# Patient Record
Sex: MALE | Race: WHITE | URBAN - METROPOLITAN AREA
[De-identification: names, ages, dates, MRNs, and addresses within clinical notes are randomized per-mention and may not be internally consistent; named-entity substitution may affect disease eponyms.]

---

## 2022-03-19 PROBLEM — I10 ESSENTIAL HYPERTENSION: Status: ACTIVE | Noted: 2017-02-10

## 2022-08-15 RX ORDER — LOSARTAN POTASSIUM 50 MG/1
TABLET ORAL
Qty: 90 TABLET | Refills: 7 | Status: SHIPPED | OUTPATIENT
Start: 2022-08-15

## 2022-09-20 ENCOUNTER — OFFICE VISIT (OUTPATIENT)
Dept: CARDIOLOGY CLINIC | Age: 67
End: 2022-09-20
Payer: MEDICARE

## 2022-09-20 VITALS
SYSTOLIC BLOOD PRESSURE: 136 MMHG | BODY MASS INDEX: 30.21 KG/M2 | HEART RATE: 96 BPM | WEIGHT: 211 LBS | DIASTOLIC BLOOD PRESSURE: 84 MMHG | HEIGHT: 70 IN

## 2022-09-20 DIAGNOSIS — I10 ESSENTIAL HYPERTENSION: ICD-10-CM

## 2022-09-20 DIAGNOSIS — I25.10 CORONARY ARTERY DISEASE INVOLVING NATIVE CORONARY ARTERY OF NATIVE HEART WITHOUT ANGINA PECTORIS: Primary | ICD-10-CM

## 2022-09-20 DIAGNOSIS — E78.5 DYSLIPIDEMIA: ICD-10-CM

## 2022-09-20 PROCEDURE — G8417 CALC BMI ABV UP PARAM F/U: HCPCS | Performed by: INTERNAL MEDICINE

## 2022-09-20 PROCEDURE — 3017F COLORECTAL CA SCREEN DOC REV: CPT | Performed by: INTERNAL MEDICINE

## 2022-09-20 PROCEDURE — 1123F ACP DISCUSS/DSCN MKR DOCD: CPT | Performed by: INTERNAL MEDICINE

## 2022-09-20 PROCEDURE — 1036F TOBACCO NON-USER: CPT | Performed by: INTERNAL MEDICINE

## 2022-09-20 PROCEDURE — 99214 OFFICE O/P EST MOD 30 MIN: CPT | Performed by: INTERNAL MEDICINE

## 2022-09-20 PROCEDURE — G8427 DOCREV CUR MEDS BY ELIG CLIN: HCPCS | Performed by: INTERNAL MEDICINE

## 2022-09-20 RX ORDER — NITROGLYCERIN 400 UG/1
1 SPRAY ORAL EVERY 5 MIN PRN
Qty: 1 EACH | Refills: 5 | Status: SHIPPED | OUTPATIENT
Start: 2022-09-20

## 2022-09-20 ASSESSMENT — ENCOUNTER SYMPTOMS
BOWEL INCONTINENCE: 0
HEMATOCHEZIA: 0
DIARRHEA: 0
WHEEZING: 0
BLURRED VISION: 0
HOARSE VOICE: 0
SPUTUM PRODUCTION: 0
HEMATEMESIS: 0
SHORTNESS OF BREATH: 0
COLOR CHANGE: 0
CHEST TIGHTNESS: 0
ORTHOPNEA: 0
ABDOMINAL PAIN: 0

## 2022-09-20 NOTE — PROGRESS NOTES
[facility] CARDIOLOGY  [address], [address]  PHONE: [phone]        22        NAME:  Tika Reyez  : 1955  MRN: [id number]       SUBJECTIVE:   Tika Reyez is a 69 y.o. male seen for a follow up visit regarding the following: The patient has CAD with CABG,primary hypertension,and symptomatic PVC's. Historically,beta blocker use has been limited due to bradycardia. He returns for scheduled follow up. He reports doing well. Chief Complaint   Patient presents with    Coronary Artery Disease     6 month       HPI:    Coronary Artery Disease  Presents for follow-up visit. Pertinent negatives include no chest pain, chest pressure, chest tightness, dizziness, leg swelling, muscle weakness, palpitations, shortness of breath or weight gain. The symptoms have been stable. Compliance with diet is good. Compliance with exercise is variable. Compliance with medications is good. Past Medical History, Past Surgical History, Family history, Social History, and Medications were all reviewed with the patient today and updated as necessary.          Current Outpatient Medications:     nitroGLYCERIN (NITROLINGUAL) 0.4 MG/SPRAY 0.4 mg spray, Place 1 spray under the tongue every 5 minutes as needed for Chest pain, Disp: 1 each, Rfl: 5    losartan (COZAAR) 50 MG tablet, TAKE 2 TABLETS DAILY, Disp: 90 tablet, Rfl: 7    aspirin 81 MG EC tablet, Take 81 mg by mouth daily, Disp: , Rfl:     pravastatin (PRAVACHOL) 40 MG tablet, Take 40 mg by mouth, Disp: , Rfl:     tadalafil (CIALIS) 5 MG tablet, Take 5 mg by mouth, Disp: , Rfl:     amLODIPine (NORVASC) 10 MG tablet, Take 10 mg by mouth daily (Patient not taking: Reported on 2022), Disp: , Rfl:   Allergies   Allergen Reactions    Finasteride Other (See Comments)    Silodosin Palpitations    Tamsulosin Palpitations     Past Medical History:   Diagnosis Date    Aortic valve insufficiency 3/2/2016    Arrhythmia     CAD (coronary artery disease)     Coronary atherosclerosis of native coronary vessel 04/2013    Essential hypertension 2/10/2017    GERD (gastroesophageal reflux disease)     Hypercholesterolemia     Hypertension     Palpitations 06/2013    Preop cardiovascular exam 2/10/2017    PVC's (premature ventricular contractions) 3/2/2016     Past Surgical History:   Procedure Laterality Date    CABG, ARTERY-VEIN, FOUR  5/27/11    CORONARY ARTERY BYPASS GRAFT  2011    LEFT HEART CATH,PERCUTANEOUS  05/22/2013    Patent LIMA-LAD,Patent DAKOTA-Ramus,Patent SVG-PDA,and LV EF=55-60%. No intervention performed. LEFT HEART CATH,PERCUTANEOUS  05/26/52011    multivessel disease    ORTHOPEDIC SURGERY  1994    back surgery, \"disc trimming\"     Family History   Problem Relation Age of Onset    Heart Disease Mother     Heart Disease Father       Social History     Tobacco Use    Smoking status: Never    Smokeless tobacco: Never    Tobacco comments:     Quit smoking: since 1979   Substance Use Topics    Alcohol use: Yes       ROS:    Review of Systems   Constitutional: Negative for chills, decreased appetite, diaphoresis, fever, malaise/fatigue and weight gain. HENT:  Negative for congestion, hearing loss, hoarse voice and nosebleeds. Eyes:  Negative for blurred vision. Cardiovascular:  Negative for chest pain, claudication, cyanosis, dyspnea on exertion, irregular heartbeat, leg swelling, near-syncope, orthopnea, palpitations, paroxysmal nocturnal dyspnea and syncope. Respiratory:  Negative for chest tightness, shortness of breath, sputum production and wheezing. Endocrine: Negative for polydipsia, polyphagia and polyuria. Skin:  Negative for color change. Musculoskeletal:  Negative for muscle weakness. Gastrointestinal:  Negative for abdominal pain, bowel incontinence, diarrhea, hematemesis and hematochezia. Genitourinary:  Negative for dysuria, frequency and hematuria.    Neurological:  Negative for dizziness, focal weakness, light-headedness, loss of balance, numbness, sensory change and weakness. Psychiatric/Behavioral:  Negative for altered mental status and memory loss. PHYSICAL EXAM:   /84   Pulse 96   Ht 5' 10\" (1.778 m)   Wt 211 lb (95.7 kg)   BMI 30.28 kg/m²      Physical Exam  Constitutional:       Appearance: Normal appearance. HENT:      Head: Normocephalic and atraumatic. Nose: Nose normal.   Eyes:      Extraocular Movements: Extraocular movements intact. Pupils: Pupils are equal, round, and reactive to light. Neck:      Vascular: No carotid bruit. Cardiovascular:      Rate and Rhythm: Regular rhythm. Pulses: Normal pulses. Heart sounds: No murmur heard. Pulmonary:      Effort: Pulmonary effort is normal.      Breath sounds: Normal breath sounds. Abdominal:      General: Abdomen is flat. Bowel sounds are normal.      Palpations: Abdomen is soft. Musculoskeletal:         General: Normal range of motion. Cervical back: Normal range of motion and neck supple. Skin:     General: Skin is warm and dry. Neurological:      General: No focal deficit present. Mental Status: He is alert and oriented to person, place, and time. Psychiatric:         Mood and Affect: Mood normal.       Medical problems and test results were reviewed with the patient today. ASSESSMENT and PLAN    Portillo Fields was seen today for coronary artery disease. Diagnoses and all orders for this visit:    Coronary artery disease involving native coronary artery of native heart without angina pectoris:Stable. Continue ASA,Pravastatin,and Losartan. -     nitroGLYCERIN (NITROLINGUAL) 0.4 MG/SPRAY 0.4 mg spray; Place 1 spray under the tongue every 5 minutes as needed for Chest pain    Essential hypertension:Stable. Continue Norvasc and Cozaar.     Dyslipidemia:Lipids are at goal.Lipid panel on 9/8/22 :UN=997,TG=46,HDL=44,and LDL=64 were at goal.Continue Pravastatin 40 mg daily.        Disposition:    Return in about 1 year (around 9/20/2023).                 Sammy Bailon MD  9/20/2022  1:10 PM

## 2023-03-06 LAB — PROSTATE SPECIFIC ANTIGEN: 1.4 NG/ML

## 2023-08-11 ENCOUNTER — PATIENT MESSAGE (OUTPATIENT)
Age: 68
End: 2023-08-11

## 2023-08-11 RX ORDER — LOSARTAN POTASSIUM 100 MG/1
100 TABLET ORAL DAILY
Qty: 90 TABLET | Refills: 3 | Status: SHIPPED | OUTPATIENT
Start: 2023-08-11

## 2023-08-11 NOTE — TELEPHONE ENCOUNTER
From: Thurnell Kehr  To: Dr. Davis Macedo: 8/11/2023 7:56 AM EDT  Subject: Losartan 100 mg daily    I run out of BP medication on 8/24 and my appointment is not until 8/29.    Could you please call in a refill to my local pharmacy ( we are no longer using [facility] mail order) I would prefer to take 1, 100 mg tablet a day ( recent RX was 2, 50 mg per day)  [address]   [address] [place] [address] [place]  Due to a family medical issue we are going to be out of town and was hoping to get this before Sunday)  Thanks  Flaco Thomas

## 2023-08-29 ENCOUNTER — OFFICE VISIT (OUTPATIENT)
Age: 68
End: 2023-08-29
Payer: MEDICARE

## 2023-08-29 VITALS
DIASTOLIC BLOOD PRESSURE: 70 MMHG | BODY MASS INDEX: 29.92 KG/M2 | WEIGHT: 209 LBS | SYSTOLIC BLOOD PRESSURE: 136 MMHG | HEART RATE: 47 BPM | HEIGHT: 70 IN

## 2023-08-29 DIAGNOSIS — I49.3 VENTRICULAR PREMATURE DEPOLARIZATION: Primary | ICD-10-CM

## 2023-08-29 DIAGNOSIS — E78.5 DYSLIPIDEMIA: ICD-10-CM

## 2023-08-29 DIAGNOSIS — I35.1 NONRHEUMATIC AORTIC VALVE INSUFFICIENCY: ICD-10-CM

## 2023-08-29 DIAGNOSIS — I25.10 CORONARY ARTERY DISEASE INVOLVING NATIVE CORONARY ARTERY OF NATIVE HEART WITHOUT ANGINA PECTORIS: ICD-10-CM

## 2023-08-29 DIAGNOSIS — I10 ESSENTIAL HYPERTENSION: ICD-10-CM

## 2023-08-29 PROCEDURE — G8417 CALC BMI ABV UP PARAM F/U: HCPCS | Performed by: INTERNAL MEDICINE

## 2023-08-29 PROCEDURE — 1036F TOBACCO NON-USER: CPT | Performed by: INTERNAL MEDICINE

## 2023-08-29 PROCEDURE — 3078F DIAST BP <80 MM HG: CPT | Performed by: INTERNAL MEDICINE

## 2023-08-29 PROCEDURE — 93000 ELECTROCARDIOGRAM COMPLETE: CPT | Performed by: INTERNAL MEDICINE

## 2023-08-29 PROCEDURE — 3017F COLORECTAL CA SCREEN DOC REV: CPT | Performed by: INTERNAL MEDICINE

## 2023-08-29 PROCEDURE — 3075F SYST BP GE 130 - 139MM HG: CPT | Performed by: INTERNAL MEDICINE

## 2023-08-29 PROCEDURE — 1123F ACP DISCUSS/DSCN MKR DOCD: CPT | Performed by: INTERNAL MEDICINE

## 2023-08-29 PROCEDURE — 99214 OFFICE O/P EST MOD 30 MIN: CPT | Performed by: INTERNAL MEDICINE

## 2023-08-29 PROCEDURE — G8427 DOCREV CUR MEDS BY ELIG CLIN: HCPCS | Performed by: INTERNAL MEDICINE

## 2023-08-29 RX ORDER — LOSARTAN POTASSIUM 100 MG/1
100 TABLET ORAL DAILY
Qty: 90 TABLET | Refills: 3 | Status: SHIPPED | OUTPATIENT
Start: 2023-08-29

## 2023-08-29 RX ORDER — NITROGLYCERIN 400 UG/1
1 SPRAY ORAL EVERY 5 MIN PRN
Qty: 1 EACH | Refills: 5 | Status: SHIPPED | OUTPATIENT
Start: 2023-08-29

## 2023-08-29 ASSESSMENT — ENCOUNTER SYMPTOMS
ABDOMINAL PAIN: 0
WHEEZING: 0
ORTHOPNEA: 0
HOARSE VOICE: 0
CHEST TIGHTNESS: 0
COLOR CHANGE: 0
HEMATOCHEZIA: 0
HEMATEMESIS: 0
DIARRHEA: 0
SPUTUM PRODUCTION: 0
BLURRED VISION: 0
SHORTNESS OF BREATH: 0
BOWEL INCONTINENCE: 0

## 2023-08-29 NOTE — PROGRESS NOTES
[facility] CARDIOLOGY  [address], [address]  PHONE: [phone]        23        NAME:  Patricia Siddiqi  : 1955  MRN: [id number]       SUBJECTIVE:   Patricia Siddiqi is a 77 y.o. male seen for a follow up visit regarding the following: CAD,primary hypertension,and PVC's. He returns for annual follow up. June Ventura reports doing well. Chief Complaint   Patient presents with    Irregular Heart Beat    Coronary Artery Disease       HPI:    Coronary Artery Disease  Presents for follow-up visit. Pertinent negatives include no chest pain, chest pressure, chest tightness, dizziness, leg swelling, muscle weakness, palpitations, shortness of breath or weight gain. The symptoms have been stable. Past Medical History, Past Surgical History, Family history, Social History, and Medications were all reviewed with the patient today and updated as necessary.          Current Outpatient Medications:     losartan (COZAAR) 100 MG tablet, Take 1 tablet by mouth daily, Disp: 90 tablet, Rfl: 3    nitroGLYCERIN (NITROLINGUAL) 0.4 MG/SPRAY 0.4 mg spray, Place 1 spray under the tongue every 5 minutes as needed for Chest pain, Disp: 1 each, Rfl: 5    amLODIPine (NORVASC) 10 MG tablet, Take 1 tablet by mouth as needed prn, Disp: , Rfl:     pravastatin (PRAVACHOL) 40 MG tablet, Take 1 tablet by mouth, Disp: , Rfl:     tadalafil (CIALIS) 5 MG tablet, Take 1 tablet by mouth, Disp: , Rfl:     aspirin 81 MG EC tablet, Take 81 mg by mouth daily (Patient not taking: Reported on 2023), Disp: , Rfl:   Allergies   Allergen Reactions    Finasteride Other (See Comments)    Silodosin Palpitations    Tamsulosin Palpitations     Past Medical History:   Diagnosis Date    Aortic valve insufficiency 3/2/2016    Arrhythmia     CAD (coronary artery disease)     Coronary atherosclerosis of native coronary vessel 2013    Essential hypertension 2/10/2017    GERD (gastroesophageal reflux disease) 16

## 2024-02-27 ENCOUNTER — OFFICE VISIT (OUTPATIENT)
Age: 69
End: 2024-02-27
Payer: MEDICARE

## 2024-02-27 VITALS
HEIGHT: 70 IN | WEIGHT: 214 LBS | HEART RATE: 64 BPM | DIASTOLIC BLOOD PRESSURE: 80 MMHG | BODY MASS INDEX: 30.64 KG/M2 | SYSTOLIC BLOOD PRESSURE: 138 MMHG

## 2024-02-27 DIAGNOSIS — I25.10 CORONARY ARTERY DISEASE INVOLVING NATIVE CORONARY ARTERY OF NATIVE HEART WITHOUT ANGINA PECTORIS: Primary | ICD-10-CM

## 2024-02-27 DIAGNOSIS — I35.1 NONRHEUMATIC AORTIC VALVE INSUFFICIENCY: ICD-10-CM

## 2024-02-27 DIAGNOSIS — E78.5 DYSLIPIDEMIA: ICD-10-CM

## 2024-02-27 DIAGNOSIS — I10 ESSENTIAL HYPERTENSION: ICD-10-CM

## 2024-02-27 PROCEDURE — 99214 OFFICE O/P EST MOD 30 MIN: CPT | Performed by: INTERNAL MEDICINE

## 2024-02-27 PROCEDURE — 1036F TOBACCO NON-USER: CPT | Performed by: INTERNAL MEDICINE

## 2024-02-27 PROCEDURE — G8427 DOCREV CUR MEDS BY ELIG CLIN: HCPCS | Performed by: INTERNAL MEDICINE

## 2024-02-27 PROCEDURE — 3017F COLORECTAL CA SCREEN DOC REV: CPT | Performed by: INTERNAL MEDICINE

## 2024-02-27 PROCEDURE — 1123F ACP DISCUSS/DSCN MKR DOCD: CPT | Performed by: INTERNAL MEDICINE

## 2024-02-27 PROCEDURE — G8484 FLU IMMUNIZE NO ADMIN: HCPCS | Performed by: INTERNAL MEDICINE

## 2024-02-27 PROCEDURE — G8417 CALC BMI ABV UP PARAM F/U: HCPCS | Performed by: INTERNAL MEDICINE

## 2024-02-27 PROCEDURE — 3075F SYST BP GE 130 - 139MM HG: CPT | Performed by: INTERNAL MEDICINE

## 2024-02-27 PROCEDURE — 3078F DIAST BP <80 MM HG: CPT | Performed by: INTERNAL MEDICINE

## 2024-02-27 RX ORDER — LOSARTAN POTASSIUM 100 MG/1
100 TABLET ORAL DAILY
Qty: 90 TABLET | Refills: 3 | Status: SHIPPED | OUTPATIENT
Start: 2024-02-27

## 2024-02-27 NOTE — PROGRESS NOTES
Basal Dimension 3.9 cm    RV Mid Dimension 2.6 cm    TAPSE 1.8 1.7 cm    Est. RA Pressure 3 mmHg    AR .0 cm    AV Vena Contracta 0.4 cm    AV Vena Contracta Area 0.4 cm2    AV VC/LVOT Diameter 0.20     AV VC/LVOT area 0.04     RVSP 21 mmHg     No results found for: \"CHOL\", \"CHOLPOCT\", \"CHOLX\", \"CHLST\", \"CHOLV\", \"HDL\", \"HDLPOC\", \"HDLC\", \"LDL\", \"LDLC\", \"VLDLC\", \"VLDL\", \"TGLX\", \"TRIGL\"  No results found for any visits on 02/27/24.    ASSESSMENT and PLAN    Rubén was seen today for coronary artery disease and irregular heart beat.    Diagnoses and all orders for this visit:    Coronary artery disease involving native coronary artery of native heart without angina pectoris:Stable with no chest pain reported.Continue prn NTG,ASA,and Pravastatin.    Essential hypertension:Borderline.Continue home BP monitoring.Call if BP remains >140/90.Continue Losartan and Norvasc.  -     losartan (COZAAR) 100 MG tablet; Take 1 tablet by mouth daily    Nonrheumatic aortic valve insufficiency:/AS/MR/TR:Mild valvular heart disease.Follow up echo in 1-2 years.    Dyslipidemia:Followed by PCP.Continue Pravastatin.Target LDL<70.          Disposition:    Return in about 1 year (around 2/27/2025).                SAIRA SIDHU MD  2/27/2024  10:20 AM

## 2024-09-04 ENCOUNTER — PATIENT MESSAGE (OUTPATIENT)
Age: 69
End: 2024-09-04

## 2025-03-11 ENCOUNTER — OFFICE VISIT (OUTPATIENT)
Age: 70
End: 2025-03-11
Payer: MEDICARE

## 2025-03-11 VITALS
WEIGHT: 217.6 LBS | HEART RATE: 55 BPM | DIASTOLIC BLOOD PRESSURE: 70 MMHG | SYSTOLIC BLOOD PRESSURE: 138 MMHG | BODY MASS INDEX: 31.22 KG/M2

## 2025-03-11 DIAGNOSIS — E78.5 DYSLIPIDEMIA: ICD-10-CM

## 2025-03-11 DIAGNOSIS — I35.0 NONRHEUMATIC AORTIC VALVE STENOSIS: ICD-10-CM

## 2025-03-11 DIAGNOSIS — I10 ESSENTIAL HYPERTENSION: ICD-10-CM

## 2025-03-11 DIAGNOSIS — I25.10 CORONARY ARTERY DISEASE INVOLVING NATIVE CORONARY ARTERY OF NATIVE HEART WITHOUT ANGINA PECTORIS: Primary | ICD-10-CM

## 2025-03-11 PROCEDURE — G8427 DOCREV CUR MEDS BY ELIG CLIN: HCPCS | Performed by: INTERNAL MEDICINE

## 2025-03-11 PROCEDURE — 93000 ELECTROCARDIOGRAM COMPLETE: CPT | Performed by: INTERNAL MEDICINE

## 2025-03-11 PROCEDURE — 1036F TOBACCO NON-USER: CPT | Performed by: INTERNAL MEDICINE

## 2025-03-11 PROCEDURE — 1123F ACP DISCUSS/DSCN MKR DOCD: CPT | Performed by: INTERNAL MEDICINE

## 2025-03-11 PROCEDURE — 99214 OFFICE O/P EST MOD 30 MIN: CPT | Performed by: INTERNAL MEDICINE

## 2025-03-11 PROCEDURE — 3075F SYST BP GE 130 - 139MM HG: CPT | Performed by: INTERNAL MEDICINE

## 2025-03-11 PROCEDURE — 3078F DIAST BP <80 MM HG: CPT | Performed by: INTERNAL MEDICINE

## 2025-03-11 PROCEDURE — 3017F COLORECTAL CA SCREEN DOC REV: CPT | Performed by: INTERNAL MEDICINE

## 2025-03-11 PROCEDURE — 1126F AMNT PAIN NOTED NONE PRSNT: CPT | Performed by: INTERNAL MEDICINE

## 2025-03-11 PROCEDURE — 1159F MED LIST DOCD IN RCRD: CPT | Performed by: INTERNAL MEDICINE

## 2025-03-11 PROCEDURE — G8419 CALC BMI OUT NRM PARAM NOF/U: HCPCS | Performed by: INTERNAL MEDICINE

## 2025-03-11 PROCEDURE — 1160F RVW MEDS BY RX/DR IN RCRD: CPT | Performed by: INTERNAL MEDICINE

## 2025-03-11 RX ORDER — AMLODIPINE BESYLATE 10 MG/1
10 TABLET ORAL PRN
Qty: 90 TABLET | Refills: 3 | Status: SHIPPED | OUTPATIENT
Start: 2025-03-11

## 2025-03-11 RX ORDER — TRAZODONE HYDROCHLORIDE 50 MG/1
50 TABLET ORAL NIGHTLY
COMMUNITY

## 2025-03-11 RX ORDER — PRAVASTATIN SODIUM 40 MG
40 TABLET ORAL DAILY
Qty: 90 TABLET | Refills: 3 | Status: SHIPPED | OUTPATIENT
Start: 2025-03-11

## 2025-03-11 RX ORDER — LOSARTAN POTASSIUM 100 MG/1
100 TABLET ORAL DAILY
Qty: 90 TABLET | Refills: 3 | Status: SHIPPED | OUTPATIENT
Start: 2025-03-11

## 2025-03-11 ASSESSMENT — ENCOUNTER SYMPTOMS
BOWEL INCONTINENCE: 0
HEMATOCHEZIA: 0
SPUTUM PRODUCTION: 0
WHEEZING: 0
HEMATEMESIS: 0
DIARRHEA: 0
ABDOMINAL PAIN: 0
COLOR CHANGE: 0
CHEST TIGHTNESS: 0
ORTHOPNEA: 0
BLURRED VISION: 0
HOARSE VOICE: 0
SHORTNESS OF BREATH: 0

## 2025-03-11 NOTE — PROGRESS NOTES
[facility] CARDIOLOGY  [address] [place] [address], [facility] [address]  [place] [address]  PHONE: [phone]        25        NAME:  Rubén Levin  : 1955  MRN: [id number]       SUBJECTIVE:   Rubén Levin is a 69 y.o. male seen for a follow up visit regarding the following: The patient has CAD,PVC's,mild AS, Hyperlipidemia,and primary hypertension.He returns for annual follow up.Overall,he reports doing well.    Chief Complaint   Patient presents with    Coronary Artery Disease       HPI:    Coronary Artery Disease  Presents for follow-up visit. Pertinent negatives include no chest pain, chest pressure, chest tightness, dizziness, leg swelling, muscle weakness, palpitations, shortness of breath or weight gain. The symptoms have been stable.       Past Medical History, Past Surgical History, Family history, Social History, and Medications were all reviewed with the patient today and updated as necessary.         Current Outpatient Medications:     traZODone (DESYREL) 50 MG tablet, Take 1 tablet by mouth nightly, Disp: , Rfl:     losartan (COZAAR) 100 MG tablet, Take 1 tablet by mouth daily, Disp: 90 tablet, Rfl: 3    pravastatin (PRAVACHOL) 40 MG tablet, Take 1 tablet by mouth daily, Disp: 90 tablet, Rfl: 3    amLODIPine (NORVASC) 10 MG tablet, Take 1 tablet by mouth as needed (elevated blood pressure) prn, Disp: 90 tablet, Rfl: 3    nitroGLYCERIN (NITROLINGUAL) 0.4 MG/SPRAY 0.4 mg spray, Place 1 spray under the tongue every 5 minutes as needed for Chest pain, Disp: 1 each, Rfl: 5    tadalafil (CIALIS) 5 MG tablet, Take 1 tablet by mouth, Disp: , Rfl:     aspirin 81 MG EC tablet, Take 1 tablet by mouth daily (Patient not taking: Reported on 3/11/2025), Disp: , Rfl:   Allergies   Allergen Reactions    Finasteride Other (See Comments)    Silodosin Palpitations    Tamsulosin Palpitations     Past Medical History:   Diagnosis Date    Aortic valve insufficiency 3/2/2016    Arrhythmia     CAD (coronary

## 2025-08-06 ENCOUNTER — PATIENT MESSAGE (OUTPATIENT)
Age: 70
End: 2025-08-06

## 2025-08-06 DIAGNOSIS — I10 ESSENTIAL HYPERTENSION: ICD-10-CM

## 2025-08-06 RX ORDER — AMLODIPINE BESYLATE 10 MG/1
10 TABLET ORAL PRN
Qty: 90 TABLET | Refills: 3 | Status: SHIPPED | OUTPATIENT
Start: 2025-08-06